# Patient Record
(demographics unavailable — no encounter records)

---

## 2017-09-13 NOTE — EDPHY
H & P


Stated Complaint: sudden loss of vision in L eye x 4hrs, aphasic x 1 week.


HPI/ROS: 





HPI


The patient presents with vision changes over the last 4 hours.  She initially 

felt as if a piece of hair or eyelash was stuck in her left eye about 4 hours 

ago while she was walking.  Her symptoms got progressively worse and she came 

in to the emergency department.  She describes it as if there is a thin brown 

paper bag overlying her entire visual field.  She has no pain.  She has no 

prior history of similar.  Yesterday, she hike [place] approx 13,000 

ft.  Afterwards she developed which she describes as a mild migraine headache 

which was in her left frontal and retro-orbital region.  This has improved 

completely today.  She did mention to the nurse at triage that she was having 

some word-finding difficulties.  She says over the last 3 weeks she sometimes 

gets her wording wrong though this happens very rarely.  She has no slurring of 

her speech or memory loss.  She denies any difficulty swallowing, weakness or 

numbness of her arms or legs or any facial droop.





REVIEW OF SYSTEMS


Constitutional:  No fever, no chills.


Eyes:  No discharge.


ENT:  No sore throat.


Cardiovascular:  No chest pain, no palpitations.


Respiratory:  No cough, no shortness of breath.


Gastrointestinal:  No abdominal pain, no vomiting.


Genitourinary:  No hematuria.


Musculoskeletal:  No back pain.


Skin:  No rashes.


Neurological:  No headache.





PMHx:  History of possible brain aneurysm





Soc Hx:  Works as an RN








PHYSICAL


General Appearance: Alert, no distress


EYE EXAM


Visual Acuity:  Left eye shadows only, cannot count fingers, right eye 20/40, 

there is no obvious visual field cut on the left


Pupils: equal round and reactive to light EOMI


Skin: no proptosis, no periorbital erythema or swelling, no vesicles


Conjunctivae: not injected, no discharge


ENT, Mouth: Mucous membranes moist


Respiratory:  Breathing comfortably


Neurological:  A&O, cranial nerves 2-12 are intact, moves all extremities


Skin:  Warm and dry, no rashes


Musculoskeletal: Neck is supple non tender 


Extremities:  symmetrical, full range of motion 


Psychiatric:  Patient is oriented X 3, there is no agitation 





Source: Patient


Exam Limitations: No limitations





- Personal History


Current Tetanus Diphtheria and Acellular Pertussis (TDAP): Unsure





- Medical/Surgical History


Hx Asthma: No


Hx Chronic Respiratory Disease: No


Hx Diabetes: No


Hx Cardiac Disease: No


Hx Renal Disease: No


Hx Cirrhosis: No


Hx Alcoholism: No


Hx HIV/AIDS: No


Hx Splenectomy or Spleen Trauma: No


Other PMH: sjogrens, hypothyroid, depression





- Social History


Smoking Status: Never smoked


Constitutional: 


 Initial Vital Signs











Temperature (C)  36.4 C   09/13/17 22:54


 


Heart Rate  83   09/13/17 22:54


 


Respiratory Rate  16   09/13/17 22:54


 


Blood Pressure  160/92 H  09/13/17 22:54


 


O2 Sat (%)  97   09/13/17 22:54








 











O2 Delivery Mode               Room Air














Allergies/Adverse Reactions: 


 





No Known Allergies Allergy (Unverified 09/13/17 22:50)


 








Home Medications: 














 Medication  Instructions  Recorded


 


Calcium Carbonate [Calcium] 500 mg PO 09/13/17


 


Cevimeline HCl [Evoxac]  09/13/17


 


Escitalopram Oxalate [Lexapro 10  09/13/17





MG]  


 


Levoxyl  09/13/17


 


Sennosides/Docusate Sodium [Stool  09/13/17





Softener Tablet]  














Medical Decision Making


Procedures: 





Bedside ocular Ultrasound- performed and interpreted by me.


Indication:  Left-sided vision change


Findings:  1/3 of posterior chamber with homogeneous appearance fluid which is 

mobile, there is no obvious retinal detachment, vitreous detachment possible


Impression:  Vitreus detachment likely with hemorrhage


Differential Diagnosis: 





This is a 60-year-old female history of migraine headache and possible brain 

aneurysm with acute painless vision change of her left eye.  On exam, she has 

extensive vitreus hemorrhage of her left eye, with likely associated vitreus 

detachment.  Ultrasound does not reveal any retinal detachment.





She does not have a headache, though does have a history of migraine, her 

neurologic exam is otherwise normal.  I doubt CVA or subarachnoid hemorrhage.





I discussed the diagnosis with her.  I discussed the case with the 

ophthalmologist on call Dr. Leslie Lopez.  She agreed that the patient needs 

urgent follow-up tomorrow morning.  The patient is to call her clinic at 8:30 

a.m. for an appointment later in the morning.  I will discharge the patient and 

I have given her return precautions.





Departure





- Departure


Disposition: Home, Routine, Self-Care


Clinical Impression: 


 Vitreous detachment of left eye, Vitreous hemorrhage, left eye





Condition: Good


Instructions:  Blurred Vision (ED)


Additional Instructions: 


Please call the Ophthalmologist's office tomorrow at 8:30am for an appointment 

later in the morning.





[phone]


Referrals: 


Leslie Lopez MD [Non Staff Provider (MD)] - As per Instructions